# Patient Record
Sex: FEMALE | Race: WHITE
[De-identification: names, ages, dates, MRNs, and addresses within clinical notes are randomized per-mention and may not be internally consistent; named-entity substitution may affect disease eponyms.]

---

## 2021-12-03 ENCOUNTER — HOSPITAL ENCOUNTER (EMERGENCY)
Dept: HOSPITAL 76 - ED | Age: 56
Discharge: HOME | End: 2021-12-03
Payer: COMMERCIAL

## 2021-12-03 VITALS — SYSTOLIC BLOOD PRESSURE: 161 MMHG | DIASTOLIC BLOOD PRESSURE: 96 MMHG

## 2021-12-03 DIAGNOSIS — L03.114: Primary | ICD-10-CM

## 2021-12-03 PROCEDURE — 99281 EMR DPT VST MAYX REQ PHY/QHP: CPT

## 2021-12-03 PROCEDURE — 99282 EMERGENCY DEPT VISIT SF MDM: CPT

## 2021-12-03 NOTE — ED PHYSICIAN DOCUMENTATION
History of Present Illness





- Stated complaint


Stated Complaint: KNOT IN VEIN AFTER BLOOD DRAW





- Chief complaint


Chief Complaint: Ext Problem





- Additonal information


Additional information: 


56-year-old female presents emergency department duration of left antecubital 

redness and pain.  This is the site where she had a venipuncture on 11 November.

 Treatment ports that it remained tender despite warm compress and yesterday 

afternoon she noticed that a pustule formed and began to drain.  She also 

developed some minor erythema measuring about 2 x 2 cm.





No previous history of MRSA or abscess development.  No fevers no red streaking.

Reports tetanus is up-to-date.








Review of Systems


Constitutional: denies: Fever, Chills


Eyes: reports: Reviewed and negative


Ears: reports: Reviewed and negative


Nose: reports: Reviewed and negative


Throat: reports: Reviewed and negative


Cardiac: reports: Reviewed and negative


Respiratory: reports: Reviewed and negative


GI: reports: Reviewed and negative


: reports: Reviewed and negative


Skin: reports: Lesions





PD PAST MEDICAL HISTORY





- Past Surgical History


Past Surgical History: Yes


Ortho: Shoulder arthroplasty





- Present Medications


Home Medications: 


                                Ambulatory Orders











 Medication  Instructions  Recorded  Confirmed


 


Doxycycline Hyclate 100 mg PO BID #14 cap 12/03/21 














- Allergies


Allergies/Adverse Reactions: 


                                    Allergies











Allergy/AdvReac Type Severity Reaction Status Date / Time


 


codeine Allergy  Unknown Verified 12/03/21 20:29


 


nut - unspecified Allergy  Anaphylaxis Verified 12/03/21 20:29


 


shellfish derived Allergy  Anaphylaxis Verified 12/03/21 20:29


 


Sulfa (Sulfonamide Allergy  Unknown Verified 12/03/21 20:29





Antibiotics)     














- Social History


Does the pt smoke?: No


Smoking Status: Never smoker


Does the pt drink ETOH?: Yes


Does the pt have substance abuse?: No





- POLST


Patient has POLST: No





PD ED PE EXPANDED





- Extremities


Extremities: Normal, Left elbow (normal flexion/extension. 2x2 cm area of 

induration and erythema.  no draiange. Limited bedside ultrasound does not 

reveal abscess collection.).  No: Deformity, Tenderness





Results





- Vitals


Vitals: 


                               Vital Signs - 24 hr











  12/03/21





  20:25


 


Temperature 36.4 C L


 


Heart Rate 89


 


Respiratory 18





Rate 


 


Blood Pressure 161/96 H


 


O2 Saturation 98








                                     Oxygen











O2 Source                      Room air

















PD MEDICAL DECISION MAKING





- ED course


Complexity details: reviewed results, re-evaluated patient, considered 

differential, d/w patient


ED course: 





56-year-old female presents emergency department for left antecubital erythema 

and induration at the site of venipuncture draw now nearly 3 weeks ago.  It had 

gotten progressively red and tender and early yesterday a pustule drained 

purulent fluid.  Limited bedside ultrasound does not reveal any further abscess 

collection though there is mild surrounding cellulitis.  Patient has an allergy 

to Bactrim will be started on doxycycline.  Advised warm compress, antibiotic oi

ntment.  Emergent worrisome return precautions discussed.  Patient's tetanus is 

up-to-date.





Departure





- Departure


Disposition: 01 Home, Self Care


Clinical Impression: 


 Left arm cellulitis





Condition: Stable


Record reviewed to determine appropriate education?: Yes


Instructions:  Cellulitis Dc


Prescriptions: 


Doxycycline Hyclate 100 mg PO BID #14 cap


Comments: 


Kristin nova were seen today in the emergency department for pain and milky 

drainage at the site where you had a blood draw a number of weeks ago. It does 

appear as though you have a superficial skin infection also known as cellulitis.

The ultrasound did not show a deeper space infection or abscess development.





Please fill the prescription for the doxycycline and begin taking twice daily as

directed. I do recommend a warm compress over this twice daily followed by any 

antibiotic ointment.  This has been sent electronically to [facility] in [place]





I would expect the pain and redness to be improved over the next 3 to 4 days 

with antibiotics. If not improving, you have increased redness swelling milky 

drainage or red streaking then please return immediately to the ER for a second 

evaluation.


Discharge Date/Time: 12/03/21 20:53